# Patient Record
Sex: MALE | Race: WHITE | URBAN - METROPOLITAN AREA
[De-identification: names, ages, dates, MRNs, and addresses within clinical notes are randomized per-mention and may not be internally consistent; named-entity substitution may affect disease eponyms.]

---

## 2022-03-29 SDOH — HEALTH STABILITY: PHYSICAL HEALTH: ON AVERAGE, HOW MANY DAYS PER WEEK DO YOU ENGAGE IN MODERATE TO STRENUOUS EXERCISE (LIKE A BRISK WALK)?: 5 DAYS

## 2022-03-29 SDOH — HEALTH STABILITY: PHYSICAL HEALTH: ON AVERAGE, HOW MANY MINUTES DO YOU ENGAGE IN EXERCISE AT THIS LEVEL?: 60 MIN

## 2022-03-30 ENCOUNTER — OFFICE VISIT (OUTPATIENT)
Dept: FAMILY MEDICINE CLINIC | Age: 22
End: 2022-03-30
Payer: COMMERCIAL

## 2022-03-30 VITALS
BODY MASS INDEX: 17.24 KG/M2 | SYSTOLIC BLOOD PRESSURE: 100 MMHG | DIASTOLIC BLOOD PRESSURE: 72 MMHG | HEART RATE: 85 BPM | OXYGEN SATURATION: 100 % | WEIGHT: 116.4 LBS | HEIGHT: 69 IN

## 2022-03-30 DIAGNOSIS — L70.0 ACNE VULGARIS: ICD-10-CM

## 2022-03-30 DIAGNOSIS — Z76.89 ENCOUNTER TO ESTABLISH CARE: Primary | ICD-10-CM

## 2022-03-30 PROCEDURE — 99203 OFFICE O/P NEW LOW 30 MIN: CPT | Performed by: STUDENT IN AN ORGANIZED HEALTH CARE EDUCATION/TRAINING PROGRAM

## 2022-03-30 RX ORDER — DOXYCYCLINE HYCLATE 50 MG/1
50 TABLET, FILM COATED ORAL 2 TIMES DAILY
Qty: 60 TABLET | Refills: 0 | Status: SHIPPED | OUTPATIENT
Start: 2022-03-30 | End: 2022-04-26

## 2022-03-30 SDOH — ECONOMIC STABILITY: TRANSPORTATION INSECURITY
IN THE PAST 12 MONTHS, HAS LACK OF TRANSPORTATION KEPT YOU FROM MEETINGS, WORK, OR FROM GETTING THINGS NEEDED FOR DAILY LIVING?: NO

## 2022-03-30 SDOH — ECONOMIC STABILITY: HOUSING INSECURITY: IN THE LAST 12 MONTHS, HOW MANY PLACES HAVE YOU LIVED?: 1

## 2022-03-30 SDOH — ECONOMIC STABILITY: TRANSPORTATION INSECURITY
IN THE PAST 12 MONTHS, HAS THE LACK OF TRANSPORTATION KEPT YOU FROM MEDICAL APPOINTMENTS OR FROM GETTING MEDICATIONS?: NO

## 2022-03-30 SDOH — ECONOMIC STABILITY: INCOME INSECURITY: IN THE LAST 12 MONTHS, WAS THERE A TIME WHEN YOU WERE NOT ABLE TO PAY THE MORTGAGE OR RENT ON TIME?: NO

## 2022-03-30 SDOH — ECONOMIC STABILITY: HOUSING INSECURITY
IN THE LAST 12 MONTHS, WAS THERE A TIME WHEN YOU DID NOT HAVE A STEADY PLACE TO SLEEP OR SLEPT IN A SHELTER (INCLUDING NOW)?: NO

## 2022-03-30 SDOH — ECONOMIC STABILITY: FOOD INSECURITY: WITHIN THE PAST 12 MONTHS, YOU WORRIED THAT YOUR FOOD WOULD RUN OUT BEFORE YOU GOT MONEY TO BUY MORE.: NEVER TRUE

## 2022-03-30 SDOH — ECONOMIC STABILITY: FOOD INSECURITY: WITHIN THE PAST 12 MONTHS, THE FOOD YOU BOUGHT JUST DIDN'T LAST AND YOU DIDN'T HAVE MONEY TO GET MORE.: NEVER TRUE

## 2022-03-30 ASSESSMENT — PATIENT HEALTH QUESTIONNAIRE - PHQ9
SUM OF ALL RESPONSES TO PHQ QUESTIONS 1-9: 0
1. LITTLE INTEREST OR PLEASURE IN DOING THINGS: 0
SUM OF ALL RESPONSES TO PHQ QUESTIONS 1-9: 0
2. FEELING DOWN, DEPRESSED OR HOPELESS: 0
SUM OF ALL RESPONSES TO PHQ9 QUESTIONS 1 & 2: 0

## 2022-03-30 ASSESSMENT — ENCOUNTER SYMPTOMS
SHORTNESS OF BREATH: 0
CONSTIPATION: 0
DIARRHEA: 0
VOMITING: 0
NAUSEA: 0

## 2022-03-30 ASSESSMENT — SOCIAL DETERMINANTS OF HEALTH (SDOH): HOW HARD IS IT FOR YOU TO PAY FOR THE VERY BASICS LIKE FOOD, HOUSING, MEDICAL CARE, AND HEATING?: NOT HARD AT ALL

## 2022-03-30 NOTE — PROGRESS NOTES
3/30/2022    Carrie Ayers (:  2000) is a 22 y.o. male, here for evaluation of the following medical concerns:    HPI    Acne on back  Using benzoyl peroxide which has helped some  Present for years but had not previously treated    Has a few spots on his head that he feels has not changed but his mother is worried about. Present since childhood. No family history fo skin cancer    Reports previously not eating  Scared of choking previously, no longer having this sensation  1 lb every 2 weeks now, and eating well  Ongoing most of highschool, improved senior year. 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 Eyes: Negative for visual disturbance. Respiratory: Negative for shortness of breath. Cardiovascular: Negative for chest pain and palpitations. Gastrointestinal: Negative for constipation, diarrhea, nausea and vomiting. Genitourinary: Negative for difficulty urinating. Musculoskeletal: Negative for gait problem. Skin: Negative for rash. Neurological: Negative for dizziness and light-headedness. Psychiatric/Behavioral: Negative for confusion and decreased concentration. All other systems reviewed and negative    Prior to Visit Medications    Medication Sig Taking? Authorizing Provider   doxycycline hyclate 50 MG TABS Take 50 mg by mouth 2 times daily Yes Artie Jones DO        No Known Allergies    History reviewed. No pertinent past medical history. History reviewed. No pertinent surgical history.     Social History     Socioeconomic History    Marital status: Unknown     Spouse name: Not on file    Number of children: Not on file    Years of education: Not on file    Highest education level: Not on file   Occupational History  Not on file   Tobacco Use    Smoking status: Never Smoker    Smokeless tobacco: Never Used   Vaping Use    Vaping Use: Never used   Substance and Sexual Activity    Alcohol use: Never    Drug use: Never    Sexual activity: Never     Partners: Female   Other Topics Concern    Not on file   Social History Narrative    Not on file     Social Determinants of Health     Financial Resource Strain: Low Risk     Difficulty of Paying Living Expenses: Not hard at all   Food Insecurity: No Food Insecurity    Worried About [address] in the Last Year: Never true    [address] in the Last Year: Never true   Transportation Needs: No Transportation Needs    Lack of Transportation (Medical): No    Lack of Transportation (Non-Medical): No   Physical Activity: Sufficiently Active    Days of Exercise per Week: 5 days    Minutes of Exercise per Session: 60 min   Stress:     Feeling of Stress : Not on file   Social Connections:     Frequency of Communication with Friends and Family: Not on file    Frequency of Social Gatherings with Friends and Family: Not on file    Attends Confucianist Services: Not on file    Active Member of Clubs or Organizations: Not on file    Attends Club or Organization Meetings: Not on file    Marital Status: Not on file   Intimate Partner Violence: Not At Risk    Fear of Current or Ex-Partner: No    Emotionally Abused: No    Physically Abused: No    Sexually Abused: No   Housing Stability: Low Risk     Unable to Pay for Housing in the Last Year: No    Number of Places Lived in the Last Year: 1    Unstable Housing in the Last Year: No        History reviewed. No pertinent family history.     Vitals:    03/30/22 0904   BP: 100/72   Site: Right Upper Arm   Position: Sitting   Cuff Size: Medium Adult   Pulse: 85   SpO2: 100%   Weight: 116 lb 6.4 oz (52.8 kg)   Height: 5' 8.94\" (1.751 m)     Estimated body mass index is 17.22 kg/m² as calculated from the following: Height as of this encounter: 5' 8.94\" (1.751 m). Weight as of this encounter: 116 lb 6.4 oz (52.8 kg). Physical Exam  Vitals reviewed. Constitutional:       Appearance: Normal appearance. HENT:      Head: Normocephalic and atraumatic. Cardiovascular:      Rate and Rhythm: Normal rate and regular rhythm. Pulmonary:      Effort: Pulmonary effort is normal.      Breath sounds: Normal breath sounds. Neurological:      General: No focal deficit present. Mental Status: He is alert and oriented to person, place, and time. Psychiatric:         Behavior: Behavior normal.         Thought Content: Thought content normal.                     ASSESSMENT/PLAN:  1. Encounter to establish care  Reports previously following with pediatrician and up to date on childhood immunizations. 2. Acne vulgaris  Will start doxycycline given large area and inflammation. 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 Will send photos through [employer] to monitor progress on medication.   - doxycycline hyclate 50 MG TABS; Take 50 mg by mouth 2 times daily  Dispense: 60 tablet; Refill: 0    Will continue to monitor scalp lesions yearly given report of unchanging nature. Return in about 1 year (around 3/30/2023). An  electronic signature was used to authenticate this note.     --Kylee Suazo, DO on 3/30/2022 at 9:36 AM

## 2022-04-26 DIAGNOSIS — L70.0 ACNE VULGARIS: ICD-10-CM

## 2022-04-26 RX ORDER — DOXYCYCLINE HYCLATE 50 MG/1
CAPSULE ORAL
Qty: 180 CAPSULE | Refills: 0 | Status: SHIPPED | OUTPATIENT
Start: 2022-04-26

## 2022-04-26 NOTE — TELEPHONE ENCOUNTER
Refill Request     Last Seen: Last Seen Department: 3/30/2022  Last Seen by PCP: 3/30/2022    Last Written: 3/30/22 60 tablet  0 refill     Next Appointment: 4/27/22     Future Appointments   Date Time Provider Damaris White   4/27/2022  9:30 AM DO APARNA Rico   4/3/2023  8:00 AM DO APARNA Rico  [facility]       Future appointment scheduled      Requested Prescriptions     Pending Prescriptions Disp Refills    doxycycline (VIBRAMYCIN) 50 MG capsule [Pharmacy Med Name: DOXYCYCLINE HYCLATE 50 MG CAP] 90 capsule 1     Sig: TAKE 1 CAPSULE BY MOUTH TWICE A DAY

## 2022-04-27 ENCOUNTER — OFFICE VISIT (OUTPATIENT)
Dept: FAMILY MEDICINE CLINIC | Age: 22
End: 2022-04-27
Payer: COMMERCIAL

## 2022-04-27 VITALS
SYSTOLIC BLOOD PRESSURE: 110 MMHG | HEART RATE: 102 BPM | BODY MASS INDEX: 16.81 KG/M2 | DIASTOLIC BLOOD PRESSURE: 74 MMHG | OXYGEN SATURATION: 99 % | WEIGHT: 113.6 LBS

## 2022-04-27 DIAGNOSIS — R20.9 SKIN SENSATION DISTURBANCE: Primary | ICD-10-CM

## 2022-04-27 DIAGNOSIS — L70.0 ACNE VULGARIS: ICD-10-CM

## 2022-04-27 PROCEDURE — 99213 OFFICE O/P EST LOW 20 MIN: CPT | Performed by: STUDENT IN AN ORGANIZED HEALTH CARE EDUCATION/TRAINING PROGRAM

## 2022-04-27 NOTE — PROGRESS NOTES
Patient: Salomón Dunn is a 22 y.o. male who presents today with the following Chief Complaint(s):  Chief Complaint   Patient presents with    Other     5 days, sensation on head           HPI     Reports migraine 5 days ago with severe headache and emesis. Then went to sleep and woke up with itching/tingling feeling on his head. Not painful so has jsut been waiting for this to go away. Occurs consistently. Moving around makes this improve and nothing makes it worse. Has not noticed any specific skin lesion. Gradually improving in intensity every day. No vision or hearing changes. Current Outpatient Medications   Medication Sig Dispense Refill    doxycycline (VIBRAMYCIN) 50 MG capsule TAKE 1 CAPSULE BY MOUTH TWICE A  capsule 0     No current facility-administered medications for this visit. Patient's past medical history, surgical history, family history, medications,  andallergies  were all reviewed and updated as appropriate today. Review of Systems  All other systems reviewed and negative    Physical Exam  Skin:     Comments: No observable skin abnormality on scalp. No change in sensation during examination. Has had significant improvement of acne with only a few remaining lesions that are in the process of healing. Vitals:    04/27/22 0921   BP: 110/74   Pulse: 102   SpO2: 99%       Assessment:  Encounter Diagnoses   Name Primary?  Skin sensation disturbance Yes    Acne vulgaris        Plan:  1. Skin sensation disturbance  Unclear etiology of current abnormal sensation. However given slow improvement would recommend just continuing to monitor at this time. No other neurologic symptoms. 2. Acne vulgaris  Significant improvement on doxycycline therapy. Will continue. No follow-ups on file.

## 2022-06-14 ENCOUNTER — OFFICE VISIT (OUTPATIENT)
Dept: FAMILY MEDICINE CLINIC | Age: 22
End: 2022-06-14
Payer: COMMERCIAL

## 2022-06-14 VITALS
WEIGHT: 107.8 LBS | BODY MASS INDEX: 15.95 KG/M2 | SYSTOLIC BLOOD PRESSURE: 110 MMHG | DIASTOLIC BLOOD PRESSURE: 80 MMHG | HEART RATE: 99 BPM | OXYGEN SATURATION: 98 %

## 2022-06-14 DIAGNOSIS — F50.9 EATING DISORDER, UNSPECIFIED TYPE: ICD-10-CM

## 2022-06-14 DIAGNOSIS — E46 PROTEIN-CALORIE MALNUTRITION, UNSPECIFIED SEVERITY (HCC): Primary | ICD-10-CM

## 2022-06-14 PROCEDURE — 99213 OFFICE O/P EST LOW 20 MIN: CPT | Performed by: STUDENT IN AN ORGANIZED HEALTH CARE EDUCATION/TRAINING PROGRAM

## 2022-06-14 NOTE — PROGRESS NOTES
Patient: Nory Hopkins is a 22 y.o. male who presents today with the following Chief Complaint(s):  Chief Complaint   Patient presents with    Abdominal Pain     tension, upper region, a month ago, gets worse with more stress         HPI     Reports that he has had lack of appetite for the last few weeks. Feels that he does not need to eat because he is nto hungry. Had similar issues in highschool but improved on its own. Had a week or two that he was almost not eating anything but has been pushing himself to eat again and now getting around 2000 richard a day. Reports that he had abdominal sensation that then triggered poor eating habits. Does not notice when he is distracted but triggered by anxiety or stress. Lasts a minute or two. Has not had this sensation before. Feels like a skin tightening sensation. No Nausea, vomiting, diarrhea, or constipation. Still working, has not been working out for ast 6 weeks. Current Outpatient Medications   Medication Sig Dispense Refill    doxycycline (VIBRAMYCIN) 50 MG capsule TAKE 1 CAPSULE BY MOUTH TWICE A DAY (Patient not taking: Reported on 6/14/2022) 180 capsule 0     No current facility-administered medications for this visit. Patient's past medical history, surgical history, family history, medications,  andallergies  were all reviewed and updated as appropriate today. Review of Systems  All other systems reviewed and negative    Physical Exam  Vitals reviewed. Constitutional:       Appearance: Normal appearance. HENT:      Head: Normocephalic and atraumatic. Cardiovascular:      Rate and Rhythm: Normal rate and regular rhythm. Pulmonary:      Effort: Pulmonary effort is normal.      Breath sounds: Normal breath sounds. Neurological:      General: No focal deficit present. Mental Status: He is alert and oriented to person, place, and time. Psychiatric:         Behavior: Behavior normal.         Thought Content:  Thought content normal.       Vitals:    06/14/22 1343   BP: 110/80   Pulse: 99   SpO2: 98%       Assessment:  Encounter Diagnoses   Name Primary?  Protein-calorie malnutrition, unspecified severity ([facility] Yes    Eating disorder, unspecified type        Plan:  1. Protein-calorie malnutrition, unspecified severity ([facility]  2. Eating disorder, unspecified type  Discussed with patient that he is showing signs of an eating disorder and malnourishment. Will refer to the [facility] recovery center for counseling and treatment. Patient expresses understanding and interested in treatment. [facility]    No follow-ups on file.

## 2024-06-11 ENCOUNTER — TELEPHONE (OUTPATIENT)
Dept: FAMILY MEDICINE CLINIC | Age: 24
End: 2024-06-11

## 2024-06-11 NOTE — TELEPHONE ENCOUNTER
Called and unable to leave voicemail for patient to call us back to schedule an appt or to update us with new PCP information.